# Patient Record
(demographics unavailable — no encounter records)

---

## 2024-10-23 NOTE — HEALTH RISK ASSESSMENT
[Patient reported mammogram was normal] : Patient reported mammogram was normal [Alone] : lives alone [MammogramDate] : 10/24

## 2024-10-23 NOTE — ASSESSMENT
[FreeTextEntry1] : age shahzad vaccs discussed discussed impt of colonoscopy given her fam hx labs/ecg

## 2024-10-23 NOTE — HISTORY OF PRESENT ILLNESS
[FreeTextEntry1] : cpx [de-identified] : on zoloft and lipitor (increased dose) mood stable will be scheduling colon--has info optho, derm has decreased etoh intake does ashley

## 2025-01-22 NOTE — HISTORY OF PRESENT ILLNESS
[Work related] : work related [Sudden] : sudden [5] : 5 [Dull/Aching] : dull/aching [Sharp] : sharp [Stabbing] : stabbing [Frequent] : frequent [Household chores] : household chores [Rest] : rest [Sitting] : sitting [Full time] : Work status: full time [de-identified] : WC DOI: 10/21/24 1/22/25: She is a right-hand-dominant female who presents for initial evaluation of work-related injury.  She notes while at work on 10/1/2024 she was walking/assisting a person with disability who had a fall she tried to reach and catch the person welled up falling and injuring her right shoulder.  Since that time she has noted pain with reaching grasping and sleeping.  She has had difficulties with ADLs involving activities of rotation.  She has tried Tylenol with minimal help.  She has continued to work [] : This patient has had an injection before: no [FreeTextEntry1] : right shoulder  [FreeTextEntry3] : 10/21/2024 [de-identified] : [employer]

## 2025-01-22 NOTE — PHYSICAL EXAM
[Right] : right shoulder [Sitting] : sitting [Moderate] : moderate [4 ___] : forward flexion 4[unfilled]/5 [4___] : internal rotation 4[unfilled]/5 [] : positive impingement testing [FreeTextEntry9] : She tolerates near full range of motion with pain at the endpoint of forward flexion and pain on internal rotation

## 2025-01-22 NOTE — WORK
[Sprain/Strain] : sprain/strain [Was the competent medical cause of the injury] : was the competent medical cause of the injury [Are consistent with the injury] : are consistent with the injury [Consistent with my objective findings] : consistent with my objective findings [Mild Partial] : mild partial [Does not reveal pre-existing condition(s) that may affect treatment/prognosis] : does not reveal pre-existing condition(s) that may affect treatment/prognosis [Can return to work with limitations on ______] : can return to work with limitations on [unfilled] [Patient] : patient [No Rx restrictions] : No Rx restrictions. [I provided the services listed above] :  I provided the services listed above. [FreeTextEntry1] : good needs mri and therapy  [Light Work:] : Light Work: Exerting up to 20 pounds of force occasionally, and/or up to 10 pounds of force frequently and/or negligible amount of force constantly to move objects. Physical demand requirements are in excess of those for Sedentary Work. Even though the weight lifted may only be a negligible amount, a job should be rated Light Work: (1) when it requires walking or standing to a significant degree: or (2) when it requires sitting most of the time but entails pushing and/or pulling of arm or leg controls: and/or (3) when the job requires working at a production rate pace entailing the constant pushing and/or pulling of materials even though the weight of those materials is negligible. NOTE: The constant stress of maintaining a production rate pace, especially in an industrial setting, can be and is physically demanding of a worker even though the amount of force exerted is negligible.

## 2025-01-22 NOTE — ASSESSMENT
[FreeTextEntry1] : - We discussed the nature of her diagnosis.   - Prescription for meloxicam provided -  The patient's orthopaedic condition(s) warrants intermittent use of a prescription strength non-steroidal anti-inflammatory medication.  These medications are associated with risks including but not limited to gastrointestinal irritation, kidney damage, hypertension, and bleeding.  The patient understands and will take medications as prescribed.  The patient will stop the medication and consult a physician as needed if problems arise. -Prescription for physical therapy is provided -She is now nearly 3 months after the injury with continued pain and limitations on activities of daily living we will get an MRI for evaluation of possible rotator cuff tear.  We will follow her up after the MRI is completed for review and delineation of treatment plan If there is no structural damage and pain persist we discussed possible cortisone injection at that time She may continue to work

## 2025-02-05 NOTE — WORK
[Sprain/Strain] : sprain/strain [Was the competent medical cause of the injury] : was the competent medical cause of the injury [Are consistent with the injury] : are consistent with the injury [Consistent with my objective findings] : consistent with my objective findings [Mild Partial] : mild partial [Does not reveal pre-existing condition(s) that may affect treatment/prognosis] : does not reveal pre-existing condition(s) that may affect treatment/prognosis [Can return to work with limitations on ______] : can return to work with limitations on [unfilled] [Patient] : patient [No Rx restrictions] : No Rx restrictions. [I provided the services listed above] :  I provided the services listed above. [Light Work:] : Light Work: Exerting up to 20 pounds of force occasionally, and/or up to 10 pounds of force frequently and/or negligible amount of force constantly to move objects. Physical demand requirements are in excess of those for Sedentary Work. Even though the weight lifted may only be a negligible amount, a job should be rated Light Work: (1) when it requires walking or standing to a significant degree: or (2) when it requires sitting most of the time but entails pushing and/or pulling of arm or leg controls: and/or (3) when the job requires working at a production rate pace entailing the constant pushing and/or pulling of materials even though the weight of those materials is negligible. NOTE: The constant stress of maintaining a production rate pace, especially in an industrial setting, can be and is physically demanding of a worker even though the amount of force exerted is negligible. [FreeTextEntry1] : good needs mri and therapy

## 2025-02-05 NOTE — HISTORY OF PRESENT ILLNESS
[Work related] : work related [Sudden] : sudden [5] : 5 [Dull/Aching] : dull/aching [Sharp] : sharp [Stabbing] : stabbing [Frequent] : frequent [Household chores] : household chores [Rest] : rest [Sitting] : sitting [Full time] : Work status: full time [de-identified] : WC DOI: 10/21/24 02/05/2025: Patient here for follow up visit to review RT shoulder MRI results. She's feeling better.  1/22/25: She is a right-hand-dominant female who presents for initial evaluation of work-related injury.  She notes while at work on 10/1/2024 she was walking/assisting a person with disability who had a fall she tried to reach and catch the person welled up falling and injuring her right shoulder.  Since that time she has noted pain with reaching grasping and sleeping.  She has had difficulties with ADLs involving activities of rotation.  She has tried Tylenol with minimal help.  She has continued to work [] : Post Surgical Visit: no [FreeTextEntry1] : right shoulder  [FreeTextEntry3] : 10/21/2024 [de-identified] : [employer]

## 2025-02-05 NOTE — PHYSICAL EXAM
[Right] : right shoulder [Sitting] : sitting [4 ___] : forward flexion 4[unfilled]/5 [4___] : internal rotation 4[unfilled]/5 [NL (0-180)] : full active forward flexion 0-180 degrees [] : no tenderness at lateral shoulder

## 2025-02-05 NOTE — ASSESSMENT
[FreeTextEntry1] : - We discussed the nature of her diagnosis.   - Prescription for meloxicam provided -  The patient's orthopaedic condition(s) warrants intermittent use of a prescription strength non-steroidal anti-inflammatory medication.  These medications are associated with risks including but not limited to gastrointestinal irritation, kidney damage, hypertension, and bleeding.  The patient understands and will take medications as prescribed.  The patient will stop the medication and consult a physician as needed if problems arise. -Prescription for physical therapy is provided -She is now nearly 3 months after the injury with continued pain and limitations on activities of daily living we will get an MRI for evaluation of possible rotator cuff tear.  We will follow her up after the MRI is completed for review and delineation of treatment plan If there is no structural damage and pain persist we discussed possible cortisone injection at that time She may continue to work  02/05/2025: MRI of RT shoulder reviewed.  Underlying pathology and treatment options discussed.  She will continue with PT. Discussed future CSI in RT shoulder.  Activity modification as tolerated.  Questions addressed. Follow up in 4-6 weeks.   The documentation recorded by the omaribe accurately reflects the service I personally performed and the decisions made by me. I, Meet Johnson, attest that this documentation has been prepared under the direction and in the presence of Provider Adán Flores MD.   The patient was seen by Adán Flores MD.

## 2025-02-05 NOTE — DATA REVIEWED
[MRI] : MRI [Right] : of the right [Shoulder] : shoulder [Report was reviewed and noted in the chart] : The report was reviewed and noted in the chart [I independently reviewed and interpreted images and report] : I independently reviewed and interpreted images and report [FreeTextEntry1] : MRI RT shoulder [facility] 01/29/2025:  1. Highgrade tearing of the distal supraspinatus tendon insetion, which may be full thickness, with delamination and surrouding bursitis without retraction or atrophy.  2. Focal tearing of the anterior inferior labrum with adjacent effusion, synovitis, and capsular thickening and nonspecific marrow edema in the adjacent glenoid measuring 1 cm without Hill-Sachs lesion or loose body. Clinical correlation regarding possible recent anterior subluxation or dislocation episode is recommended. 3. Infraspinatus tendinopathy, subscapularis tendinopathy, biceps tenosynovitis, AC joint arthrosis, and lateral acromial bone spurs with narrowing of the supraspinatus outlet.

## 2025-02-12 NOTE — HISTORY OF PRESENT ILLNESS
[Work related] : work related [Sudden] : sudden [5] : 5 [Dull/Aching] : dull/aching [Sharp] : sharp [Stabbing] : stabbing [Frequent] : frequent [Household chores] : household chores [Rest] : rest [Sitting] : sitting [Full time] : Work status: full time [de-identified] : WC DOI: 10/21/24   02/12/2025: We saw her last week and reviewed her right shoulder MRI visits.  She has continued in physical therapy and taking meloxicam as needed.  She still has some soreness and limitations and would like to get the cortisone injection that was offered at the last visit today   02/05/2025: Patient here for follow up visit to review RT shoulder MRI results. She's feeling better.  1/22/25: She is a right-hand-dominant female who presents for initial evaluation of work-related injury.  She notes while at work on 10/1/2024 she was walking/assisting a person with disability who had a fall she tried to reach and catch the person welled up falling and injuring her right shoulder.  Since that time she has noted pain with reaching grasping and sleeping.  She has had difficulties with ADLs involving activities of rotation.  She has tried Tylenol with minimal help.  She has continued to work [] : Post Surgical Visit: no [FreeTextEntry1] : right shoulder  [FreeTextEntry3] : 10/21/2024 [de-identified] : [employer]

## 2025-02-12 NOTE — PHYSICAL EXAM
[Right] : right shoulder [NL (0-180)] : full active forward flexion 0-180 degrees [Sitting] : sitting [4 ___] : forward flexion 4[unfilled]/5 [4___] : internal rotation 4[unfilled]/5 [] : no tenderness at lateral shoulder

## 2025-02-12 NOTE — ASSESSMENT
[FreeTextEntry1] : - We discussed the nature of her diagnosis.   - Prescription for meloxicam provided -  The patient's orthopaedic condition(s) warrants intermittent use of a prescription strength non-steroidal anti-inflammatory medication.  These medications are associated with risks including but not limited to gastrointestinal irritation, kidney damage, hypertension, and bleeding.  The patient understands and will take medications as prescribed.  The patient will stop the medication and consult a physician as needed if problems arise. -Prescription for physical therapy is provided -She is now nearly 3 months after the injury with continued pain and limitations on activities of daily living we will get an MRI for evaluation of possible rotator cuff tear.  We will follow her up after the MRI is completed for review and delineation of treatment plan If there is no structural damage and pain persist we discussed possible cortisone injection at that time She may continue to work  02/05/2025: MRI of RT shoulder reviewed.  Underlying pathology and treatment options discussed.  She will continue with PT. Discussed future CSI in RT shoulder.  Activity modification as tolerated.  Questions addressed. Follow up in 4-6 weeks.   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 I, Meet Johnson, attest that this documentation has been prepared under the direction and in the presence of Provider Adán Flores MD.   The patient was seen by Adán Flores MD.

## 2025-03-26 NOTE — HISTORY OF PRESENT ILLNESS
[Work related] : work related [Sudden] : sudden [7] : 7 [3] : 3 [Dull/Aching] : dull/aching [Sharp] : sharp [Stabbing] : stabbing [Frequent] : frequent [Household chores] : household chores [Rest] : rest [Sitting] : sitting [Full time] : Work status: full time [de-identified] : WC DOI: 10/21/24 03/26/2025: Patient is here for RT shoulder follow up. Reports feeling slightly improved. Has been attending PT. The CSI gave no relief. Has not been taking the Meloxicam, prefers Tylenol use.    02/12/2025: We saw her last week and reviewed her right shoulder MRI visits.  She has continued in physical therapy and taking meloxicam as needed.  She still has some soreness and limitations and would like to get the cortisone injection that was offered at the last visit today   02/05/2025: Patient here for follow up visit to review RT shoulder MRI results. She's feeling better.  1/22/25: She is a right-hand-dominant female who presents for initial evaluation of work-related injury.  She notes while at work on 10/1/2024 she was walking/assisting a person with disability who had a fall she tried to reach and catch the person welled up falling and injuring her right shoulder.  Since that time she has noted pain with reaching grasping and sleeping.  She has had difficulties with ADLs involving activities of rotation.  She has tried Tylenol with minimal help.  She has continued to work [] : Post Surgical Visit: no [FreeTextEntry1] : right shoulder  [FreeTextEntry3] : 10/21/2024 [de-identified] : PT  [de-identified] : [employer]

## 2025-03-26 NOTE — WORK
[Sprain/Strain] : sprain/strain [Was the competent medical cause of the injury] : was the competent medical cause of the injury [Are consistent with the injury] : are consistent with the injury [Consistent with my objective findings] : consistent with my objective findings [Mild Partial] : mild partial [Does not reveal pre-existing condition(s) that may affect treatment/prognosis] : does not reveal pre-existing condition(s) that may affect treatment/prognosis [Can return to work with limitations on ______] : can return to work with limitations on [unfilled] [Patient] : patient [No Rx restrictions] : No Rx restrictions. [I provided the services listed above] :  I provided the services listed above. [Light Work:] : Light Work: Exerting up to 20 pounds of force occasionally, and/or up to 10 pounds of force frequently and/or negligible amount of force constantly to move objects. Physical demand requirements are in excess of those for Sedentary Work. Even though the weight lifted may only be a negligible amount, a job should be rated Light Work: (1) when it requires walking or standing to a significant degree: or (2) when it requires sitting most of the time but entails pushing and/or pulling of arm or leg controls: and/or (3) when the job requires working at a production rate pace entailing the constant pushing and/or pulling of materials even though the weight of those materials is negligible. NOTE: The constant stress of maintaining a production rate pace, especially in an industrial setting, can be and is physically demanding of a worker even though the amount of force exerted is negligible.

## 2025-03-26 NOTE — ASSESSMENT
[FreeTextEntry1] : - We discussed the nature of her diagnosis.   - Prescription for meloxicam provided -  The patient's orthopaedic condition(s) warrants intermittent use of a prescription strength non-steroidal anti-inflammatory medication.  These medications are associated with risks including but not limited to gastrointestinal irritation, kidney damage, hypertension, and bleeding.  The patient understands and will take medications as prescribed.  The patient will stop the medication and consult a physician as needed if problems arise. -Prescription for physical therapy is provided -She is now nearly 3 months after the injury with continued pain and limitations on activities of daily living we will get an MRI for evaluation of possible rotator cuff tear.  We will follow her up after the MRI is completed for review and delineation of treatment plan If there is no structural damage and pain persist we discussed possible cortisone injection at that time She may continue to work  02/05/2025: MRI of RT shoulder reviewed.  Underlying pathology and treatment options discussed.  She will continue with PT. Discussed future CSI in RT shoulder.  Activity modification as tolerated.  Questions addressed. Follow up in 4-6 weeks.   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  Treatment options discussed.  She will continue with PT. PT renewed.  Treatment options including medications such as NSAIDs discussed. Questions answered.  Follow up PRN.    The documentation recorded by the omaribe accurately reflects the service I personally performed and the decisions made by me. ALANA, Meet Johnson, attest that this documentation has been prepared under the direction and in the presence of Provider Adán Flores MD.   The patient was seen by Adán Flores MD.

## 2025-03-26 NOTE — PHYSICAL EXAM
[Right] : right shoulder [NL (0-180)] : full active forward flexion 0-180 degrees [Sitting] : sitting [4 ___] : forward flexion 4[unfilled]/5 [4___] : internal rotation 4[unfilled]/5 [] : no tenderness at lateral shoulder [NL (0-90)] : full external rotation 0-90 degrees

## 2025-05-07 NOTE — ASSESSMENT
[FreeTextEntry1] : - We discussed the nature of her diagnosis.   - Prescription for meloxicam provided -  The patient's orthopaedic condition(s) warrants intermittent use of a prescription strength non-steroidal anti-inflammatory medication.  These medications are associated with risks including but not limited to gastrointestinal irritation, kidney damage, hypertension, and bleeding.  The patient understands and will take medications as prescribed.  The patient will stop the medication and consult a physician as needed if problems arise. -Prescription for physical therapy is provided -She is now nearly 3 months after the injury with continued pain and limitations on activities of daily living we will get an MRI for evaluation of possible rotator cuff tear.  We will follow her up after the MRI is completed for review and delineation of treatment plan If there is no structural damage and pain persist we discussed possible cortisone injection at that time She may continue to work  02/05/2025: MRI of RT shoulder reviewed.  Underlying pathology and treatment options discussed.  She will continue with PT. Discussed future CSI in RT shoulder.  Activity modification as tolerated.  Questions addressed. Follow up in 4-6 weeks.   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  Treatment options discussed.  She will continue with PT. PT renewed.  Treatment options including medications such as NSAIDs discussed. Questions answered.  Follow up PRN.   05/07/2025: We reviewed her course.  Treatment options discussed.  New PT rx given.  Treatment options including medications such as NSAIDs discussed. Questions answered.  Follow up PRN.    The documentation recorded by the scribe accurately reflects the service I personally performed and the decisions made by me. I, Meet Johnson, attest that this documentation has been prepared under the direction and in the presence of Provider Adán Flores MD.   The patient was seen by Adán Flores MD.

## 2025-05-07 NOTE — PHYSICAL EXAM
[Right] : right shoulder [NL (0-90)] : full external rotation 0-90 degrees [Sitting] : sitting [4 ___] : forward flexion 4[unfilled]/5 [4___] : internal rotation 4[unfilled]/5 [] : no tenderness at lateral shoulder [Minimal] : minimal [TWNoteComboBox7] : active forward flexion 170 degrees

## 2025-05-07 NOTE — HISTORY OF PRESENT ILLNESS
[Work related] : work related [Sudden] : sudden [7] : 7 [3] : 3 [Dull/Aching] : dull/aching [Sharp] : sharp [Stabbing] : stabbing [Frequent] : frequent [Household chores] : household chores [Rest] : rest [Sitting] : sitting [Full time] : Work status: full time [de-identified] :  DOI: 10/21/24  05/07/2025: Patient presents for  RT shoulder FUV. There's continued pain when reaching. Has been attending PT 2x/week with some improvement. She reports working full time.   03/26/2025: Patient is here for RT shoulder follow up. Reports feeling slightly improved. Has been attending PT. The CSI gave no relief. Has not been taking the Meloxicam, prefers Tylenol use.    02/12/2025: We saw her last week and reviewed her right shoulder MRI visits.  She has continued in physical therapy and taking meloxicam as needed.  She still has some soreness and limitations and would like to get the cortisone injection that was offered at the last visit today   02/05/2025: Patient here for follow up visit to review RT shoulder MRI results. She's feeling better.  1/22/25: She is a right-hand-dominant female who presents for initial evaluation of work-related injury.  She notes while at work on 10/1/2024 she was walking/assisting a person with disability who had a fall she tried to reach and catch the person welled up falling and injuring her right shoulder.  Since that time she has noted pain with reaching grasping and sleeping.  She has had difficulties with ADLs involving activities of rotation.  She has tried Tylenol with minimal help.  She has continued to work [] : Post Surgical Visit: no [FreeTextEntry1] : right shoulder  [FreeTextEntry3] : 10/21/2024 [FreeTextEntry5] : 05.07.25: Patient is following up on the Rt shoulder pain. Pain is sharp when reaching. Pt 2x weekly. full time  [de-identified] : PT  [de-identified] : [employer]

## 2025-06-11 NOTE — ASSESSMENT
[FreeTextEntry1] : advised strongly to do colon as she has immed family hx--she is given provider info limited labs increase activity

## 2025-06-11 NOTE — HISTORY OF PRESENT ILLNESS
[de-identified] : compliant with lipitor and zoloft labs reviewed from oct--advised ldl still above ideal--admits to dietary noncompliance [FreeTextEntry1] : f/u lipids, depression